# Patient Record
Sex: FEMALE | Race: WHITE | URBAN - METROPOLITAN AREA
[De-identification: names, ages, dates, MRNs, and addresses within clinical notes are randomized per-mention and may not be internally consistent; named-entity substitution may affect disease eponyms.]

---

## 2017-05-09 ENCOUNTER — APPOINTMENT (RX ONLY)
Dept: URBAN - METROPOLITAN AREA CLINIC 51 | Facility: CLINIC | Age: 22
Setting detail: DERMATOLOGY
End: 2017-05-09

## 2017-05-09 DIAGNOSIS — D22 MELANOCYTIC NEVI: ICD-10-CM

## 2017-05-09 DIAGNOSIS — B36.0 PITYRIASIS VERSICOLOR: ICD-10-CM

## 2017-05-09 DIAGNOSIS — L91.8 OTHER HYPERTROPHIC DISORDERS OF THE SKIN: ICD-10-CM

## 2017-05-09 PROBLEM — L70.0 ACNE VULGARIS: Status: ACTIVE | Noted: 2017-05-09

## 2017-05-09 PROBLEM — J30.1 ALLERGIC RHINITIS DUE TO POLLEN: Status: ACTIVE | Noted: 2017-05-09

## 2017-05-09 PROBLEM — J45.909 UNSPECIFIED ASTHMA, UNCOMPLICATED: Status: ACTIVE | Noted: 2017-05-09

## 2017-05-09 PROBLEM — D22.71 MELANOCYTIC NEVI OF RIGHT LOWER LIMB, INCLUDING HIP: Status: ACTIVE | Noted: 2017-05-09

## 2017-05-09 PROBLEM — D22.72 MELANOCYTIC NEVI OF LEFT LOWER LIMB, INCLUDING HIP: Status: ACTIVE | Noted: 2017-05-09

## 2017-05-09 PROCEDURE — 99202 OFFICE O/P NEW SF 15 MIN: CPT

## 2017-05-09 PROCEDURE — ? PRESCRIPTION

## 2017-05-09 PROCEDURE — ? COUNSELING

## 2017-05-09 PROCEDURE — ? TREATMENT REGIMEN

## 2017-05-09 PROCEDURE — ? KOH PREP

## 2017-05-09 RX ORDER — FLUCONAZOLE 200 MG/1
TABLET ORAL
Qty: 10 | Refills: 0 | Status: ERX | COMMUNITY
Start: 2017-05-09

## 2017-05-09 RX ADMIN — FLUCONAZOLE: 200 TABLET ORAL at 13:39

## 2017-05-09 ASSESSMENT — LOCATION DETAILED DESCRIPTION DERM
LOCATION DETAILED: RIGHT PROXIMAL PRETIBIAL REGION
LOCATION DETAILED: LEFT PROXIMAL PRETIBIAL REGION
LOCATION DETAILED: LEFT ANTERIOR AXILLA
LOCATION DETAILED: RIGHT MEDIAL SUPERIOR CHEST

## 2017-05-09 ASSESSMENT — LOCATION SIMPLE DESCRIPTION DERM
LOCATION SIMPLE: LEFT PRETIBIAL REGION
LOCATION SIMPLE: CHEST
LOCATION SIMPLE: RIGHT PRETIBIAL REGION
LOCATION SIMPLE: LEFT ANTERIOR AXILLA

## 2017-05-09 ASSESSMENT — LOCATION ZONE DERM
LOCATION ZONE: TRUNK
LOCATION ZONE: AXILLAE
LOCATION ZONE: LEG

## 2017-05-09 NOTE — PROCEDURE: KOH PREP
Add  To Bill: Yes
Showing: branching hyphae
Koh Intro Text (From The.....): A KOH prep was ordered and evaluated from the
Detail Level: Zone

## 2017-05-09 NOTE — PROCEDURE: TREATMENT REGIMEN
Initiate Treatment: diflucan 200 mg once a day 10 days\\nxologel once a day 2 weeks
Detail Level: Zone
Samples Given: xologeloisa
Plan: Mild hyphae, if not better after treatment pt will call and will call in topical steroids per  MARY JANE Swan PA-C for eczema

## 2017-06-22 ENCOUNTER — RX ONLY (OUTPATIENT)
Age: 22
Setting detail: RX ONLY
End: 2017-06-22

## 2017-06-22 RX ORDER — KETOCONAZOLE 20 MG/G
CREAM TOPICAL
Qty: 60 | Refills: 0 | Status: ERX | COMMUNITY
Start: 2017-06-22